# Patient Record
Sex: FEMALE | Race: WHITE | Employment: UNEMPLOYED | URBAN - METROPOLITAN AREA
[De-identification: names, ages, dates, MRNs, and addresses within clinical notes are randomized per-mention and may not be internally consistent; named-entity substitution may affect disease eponyms.]

---

## 2017-01-19 ENCOUNTER — APPOINTMENT (OUTPATIENT)
Dept: HEMATOLOGY/ONCOLOGY | Age: 47
End: 2017-01-19
Attending: INTERNAL MEDICINE

## 2017-01-26 ENCOUNTER — SOCIAL WORK SERVICES (OUTPATIENT)
Dept: HEMATOLOGY/ONCOLOGY | Facility: HOSPITAL | Age: 47
End: 2017-01-26

## 2017-01-26 ENCOUNTER — OFFICE VISIT (OUTPATIENT)
Dept: HEMATOLOGY/ONCOLOGY | Age: 47
End: 2017-01-26
Attending: INTERNAL MEDICINE
Payer: MEDICAID

## 2017-01-26 ENCOUNTER — HOSPITAL ENCOUNTER (OUTPATIENT)
Dept: CV DIAGNOSTICS | Age: 47
Discharge: HOME | End: 2017-01-26
Attending: INTERNAL MEDICINE
Payer: MEDICAID

## 2017-01-26 VITALS
SYSTOLIC BLOOD PRESSURE: 114 MMHG | DIASTOLIC BLOOD PRESSURE: 73 MMHG | TEMPERATURE: 95 F | RESPIRATION RATE: 20 BRPM | HEART RATE: 80 BPM | WEIGHT: 174.31 LBS

## 2017-01-26 DIAGNOSIS — R60.0 EDEMA EXTREMITIES: ICD-10-CM

## 2017-01-26 DIAGNOSIS — C83.39 DIFFUSE LARGE B-CELL LYMPHOMA OF EXTRANODAL SITE (HCC): Primary | ICD-10-CM

## 2017-01-26 DIAGNOSIS — C83.39 DIFFUSE LARGE B-CELL LYMPHOMA OF EXTRANODAL SITE (HCC): ICD-10-CM

## 2017-01-26 LAB
ALBUMIN SERPL-MCNC: 2.9 G/DL (ref 3.5–4.8)
ALP LIVER SERPL-CCNC: 81 U/L (ref 39–100)
ALT SERPL-CCNC: 11 U/L (ref 14–54)
AST SERPL-CCNC: 16 U/L (ref 15–41)
BASOPHILS # BLD AUTO: 0.01 X10(3) UL (ref 0–0.1)
BASOPHILS NFR BLD AUTO: 0.2 %
BILIRUB SERPL-MCNC: 0.2 MG/DL (ref 0.1–2)
BUN BLD-MCNC: 5 MG/DL (ref 8–20)
CALCIUM BLD-MCNC: 8.6 MG/DL (ref 8.3–10.3)
CHLORIDE: 104 MMOL/L (ref 101–111)
CO2: 28 MMOL/L (ref 22–32)
CREAT BLD-MCNC: 0.72 MG/DL (ref 0.55–1.02)
EOSINOPHIL # BLD AUTO: 0.1 X10(3) UL (ref 0–0.3)
EOSINOPHIL NFR BLD AUTO: 2 %
ERYTHROCYTE [DISTWIDTH] IN BLOOD BY AUTOMATED COUNT: 13.6 % (ref 11.5–16)
GLUCOSE BLD-MCNC: 165 MG/DL (ref 70–99)
HCT VFR BLD AUTO: 36.4 % (ref 34–50)
HGB BLD-MCNC: 11.7 G/DL (ref 12–16)
IMMATURE GRANULOCYTE COUNT: 0.01 X10(3) UL (ref 0–1)
IMMATURE GRANULOCYTE RATIO %: 0.2 %
LDH: 736 U/L (ref 84–246)
LYMPHOCYTES # BLD AUTO: 1.33 X10(3) UL (ref 0.9–4)
LYMPHOCYTES NFR BLD AUTO: 26.5 %
M PROTEIN MFR SERPL ELPH: 6.9 G/DL (ref 6.1–8.3)
MCH RBC QN AUTO: 30.2 PG (ref 27–33.2)
MCHC RBC AUTO-ENTMCNC: 32.1 G/DL (ref 31–37)
MCV RBC AUTO: 94.1 FL (ref 81–100)
MONOCYTES # BLD AUTO: 0.39 X10(3) UL (ref 0.1–0.6)
MONOCYTES NFR BLD AUTO: 7.8 %
NEUTROPHIL ABS PRELIM: 3.17 X10 (3) UL (ref 1.3–6.7)
NEUTROPHILS # BLD AUTO: 3.17 X10(3) UL (ref 1.3–6.7)
NEUTROPHILS NFR BLD AUTO: 63.3 %
PLATELET # BLD AUTO: 174 10(3)UL (ref 150–450)
POTASSIUM SERPL-SCNC: 4.2 MMOL/L (ref 3.6–5.1)
RBC # BLD AUTO: 3.87 X10(6)UL (ref 3.8–5.1)
RED CELL DISTRIBUTION WIDTH-SD: 47.1 FL (ref 35.1–46.3)
SODIUM SERPL-SCNC: 141 MMOL/L (ref 136–144)
WBC # BLD AUTO: 5 X10(3) UL (ref 4–13)

## 2017-01-26 PROCEDURE — 99245 OFF/OP CONSLTJ NEW/EST HI 55: CPT | Performed by: INTERNAL MEDICINE

## 2017-01-26 PROCEDURE — 93306 TTE W/DOPPLER COMPLETE: CPT

## 2017-01-26 PROCEDURE — 93306 TTE W/DOPPLER COMPLETE: CPT | Performed by: INTERNAL MEDICINE

## 2017-01-26 RX ORDER — FENOFIBRATE 54 MG/1
54 TABLET ORAL DAILY
COMMUNITY

## 2017-01-26 RX ORDER — SIMVASTATIN 5 MG
20 TABLET ORAL NIGHTLY
COMMUNITY

## 2017-01-26 RX ORDER — EMTRICITABINE AND TENOFOVIR DISOPROXIL FUMARATE 200; 300 MG/1; MG/1
1 TABLET, FILM COATED ORAL DAILY
COMMUNITY

## 2017-01-26 RX ORDER — GLYBURIDE-METFORMIN HYDROCHLORIDE 5; 500 MG/1; MG/1
1 TABLET ORAL
COMMUNITY

## 2017-01-26 RX ORDER — LEVOTHYROXINE SODIUM 0.2 MG/1
200 TABLET ORAL
COMMUNITY

## 2017-01-26 RX ORDER — LISINOPRIL 2.5 MG/1
2.5 TABLET ORAL DAILY
COMMUNITY

## 2017-01-26 NOTE — PROGRESS NOTES
HERON met with patient who has [facility]. Patient had taxi transportation provided by [employer] from [employer]. Patient states it took 2 hours and 10 minutes. Patient states she lives alone and does not drive.   She uses public transportation

## 2017-01-26 NOTE — PROGRESS NOTES
Education Record    Learner:  Patient    Disease / Diagnosis:    Barriers / Limitations:  None   Comments:    Method:  Discussion   Comments:    General Topics:  Plan of care reviewed   Comments:    Outcome:  Shows understanding   Comments:    Echo done to

## 2017-01-27 ENCOUNTER — TELEPHONE (OUTPATIENT)
Dept: HEMATOLOGY/ONCOLOGY | Facility: HOSPITAL | Age: 47
End: 2017-01-27

## 2017-01-27 NOTE — TELEPHONE ENCOUNTER
Dr. Neal Mckoy note received by surgeon. Their office will order Pet scan and will place port a cath. Aware of attempts being made by our billing specialists to change insurance from [facility] to [facility].  They will stay in contact with patient and notif

## 2017-01-28 LAB
ABSOLUTE CD3: 1022 CELLS/UL
ABSOLUTE CD4: 439 CELLS/UL
ABSOLUTE CD4:CD8 RATIO: 0.73 RATIO
ABSOLUTE CD8: 603 CELLS/UL

## 2017-01-30 ENCOUNTER — SOCIAL WORK SERVICES (OUTPATIENT)
Dept: HEMATOLOGY/ONCOLOGY | Facility: HOSPITAL | Age: 47
End: 2017-01-30

## 2017-01-30 NOTE — PROGRESS NOTES
Rob Romero, has contacted Diane Uribe at [address] [place] [address] and she is going to reach out to patient and also [facility] and patient's PCP to attempt to coordinate patient's insurance with local oncologist.

## 2017-02-01 ENCOUNTER — SOCIAL WORK SERVICES (OUTPATIENT)
Dept: HEMATOLOGY/ONCOLOGY | Facility: HOSPITAL | Age: 47
End: 2017-02-01

## 2017-02-01 ENCOUNTER — TELEPHONE (OUTPATIENT)
Dept: HEMATOLOGY/ONCOLOGY | Facility: HOSPITAL | Age: 47
End: 2017-02-01

## 2017-02-01 NOTE — TELEPHONE ENCOUNTER
Pt states she feels like glass is going down her throat every time she drinks. She states her throat hurts all the time. She is trying to drink fluids. She states her urine is light colored. Dr. Erik Raza informed.    Pt should go to the ER either out

## 2017-02-01 NOTE — PROGRESS NOTES
Krystal Arellano, spoke with [facility]  and patient was admitted to a hospital, but signed herself out Kristy Cruz after 3 unsuccessful attempts to start an IV.   Patient's PCP and a surgeon are both trying to persuade patient to begin treatment as soon a

## (undated) NOTE — MR AVS SNAPSHOT
After Visit Summary   1/26/2017    Gilma Kevin    MRN: [id number]           Diagnoses this Visit     Diffuse large B-cell lymphoma of extranodal site [facility])    -  Primary     Edema extremities           Allergies     Sulfa Antibiotics       Your Vit The following orders were created for panel order CBC WITH DIFFERENTIAL WITH PLATELET.   Procedure                               Abnormality         Status                     ---------                               -----------         ------ MCV 94.1  81.0-100.0  fL Final    MCH 30.2  27.0-33.2  pg Final    MCHC 32.1  31.0-37.0  g/dL Final    RDW 13.6  11.5-16.0  % Final    RDW-SD 47.1 (H) 35.1-46.3  fL Final    Neutrophil Absolute Prelim 3.17  1.30-6.70  x10 (3) uL Final    Neutrophil Absolu